# Patient Record
Sex: FEMALE | Race: OTHER | NOT HISPANIC OR LATINO
[De-identification: names, ages, dates, MRNs, and addresses within clinical notes are randomized per-mention and may not be internally consistent; named-entity substitution may affect disease eponyms.]

---

## 2018-06-04 ENCOUNTER — APPOINTMENT (OUTPATIENT)
Dept: INTERNAL MEDICINE | Facility: CLINIC | Age: 63
End: 2018-06-04

## 2021-04-21 ENCOUNTER — TRANSCRIPTION ENCOUNTER (OUTPATIENT)
Age: 66
End: 2021-04-21

## 2021-07-13 ENCOUNTER — TRANSCRIPTION ENCOUNTER (OUTPATIENT)
Age: 66
End: 2021-07-13

## 2021-12-29 ENCOUNTER — APPOINTMENT (OUTPATIENT)
Dept: GASTROENTEROLOGY | Facility: CLINIC | Age: 66
End: 2021-12-29
Payer: MEDICARE

## 2021-12-29 VITALS
HEIGHT: 62 IN | WEIGHT: 121 LBS | DIASTOLIC BLOOD PRESSURE: 62 MMHG | TEMPERATURE: 98.1 F | SYSTOLIC BLOOD PRESSURE: 98 MMHG | BODY MASS INDEX: 22.26 KG/M2 | HEART RATE: 66 BPM

## 2021-12-29 PROCEDURE — 99204 OFFICE O/P NEW MOD 45 MIN: CPT

## 2021-12-29 NOTE — HISTORY OF PRESENT ILLNESS
[FreeTextEntry1] : 67 yo female with h/o bipolar disorder, started new medication 7 weeks ago.  Patient reports nausea 3 weeks ago.Peptobismol helps.Patient  saw PCP, telehealth, new PCP. Patient given pepcid last week, sometimes it helps. Patient took zofran which helps sometimes. \par no vomiting. some weight loss . constipation with peptobismol. patient reports one bm every 3 days.no abdominal pain.  no brbpr.  Thinks she has gerd. patietn stopped parmate 3 days ago.\par \par s/p colonoscopy few years ago told repeat 5 years,  Dr. Ceballos

## 2021-12-29 NOTE — REVIEW OF SYSTEMS
[As Noted in HPI] : as noted in HPI [Anxiety] : anxiety [Depression] : depression [Fever] : no fever [Chills] : no chills [Eyesight Problems] : no eyesight problems [Discharge From Eyes] : no purulent discharge from the eyes [Nosebleeds] : no nosebleeds [Chest Pain] : no chest pain [Palpitations] : no palpitations [Cough] : no cough [SOB on Exertion] : no shortness of breath during exertion [Dysmenorrhea] : no dysmenorrhea [Limb Pain] : no limb pain [Limb Swelling] : no limb swelling [Muscle Weakness] : no muscle weakness [Deepening Of The Voice] : no deepening of the voice [Swollen Glands] : no swollen glands [Swollen Glands In The Neck] : no swollen glands in the neck

## 2021-12-29 NOTE — PHYSICAL EXAM
[General Appearance - Alert] : alert [General Appearance - In No Acute Distress] : in no acute distress [General Appearance - Well Nourished] : well nourished [General Appearance - Well Developed] : well developed [Sclera] : the sclera and conjunctiva were normal [Hearing Threshold Finger Rub Not [place]] : hearing was normal [Respiration, Rhythm And Depth] : normal respiratory rhythm and effort [Auscultation Breath Sounds / Voice Sounds] : lungs were clear to auscultation bilaterally [Heart Rate And Rhythm] : heart rate was normal and rhythm regular [Heart Sounds] : normal S1 and S2 [Bowel Sounds] : normal bowel sounds [Abdomen Soft] : soft [Abdomen Tenderness] : non-tender [No CVA Tenderness] : no ~M costovertebral angle tenderness [] : no rash [Skin Lesions] : no skin lesions [No Focal Deficits] : no focal deficits [Oriented To Time, Place, And Person] : oriented to person, place, and time [Abnormal Walk] : normal gait

## 2021-12-29 NOTE — ASSESSMENT
[FreeTextEntry1] : 1. nausea, h/o intermittent nausea in the  past that resolved spontaneously, probable gerd also in ddx; gastric dysmotility, recommend egd to evaluate pt does not want to pursue at this time.\par \par plan ppi daily for one month\par          zofran prn\par        antireflux diet\par           celiac markers requested after pt on gluten diet\par 2. chronic constipation\par \par plan  miralax daily\par

## 2022-04-04 ENCOUNTER — TRANSCRIPTION ENCOUNTER (OUTPATIENT)
Age: 67
End: 2022-04-04

## 2022-04-07 ENCOUNTER — APPOINTMENT (OUTPATIENT)
Dept: NEUROLOGY | Facility: CLINIC | Age: 67
End: 2022-04-07

## 2023-01-09 ENCOUNTER — APPOINTMENT (OUTPATIENT)
Dept: GASTROENTEROLOGY | Facility: CLINIC | Age: 68
End: 2023-01-09

## 2023-08-08 ENCOUNTER — APPOINTMENT (OUTPATIENT)
Dept: RHEUMATOLOGY | Facility: CLINIC | Age: 68
End: 2023-08-08
Payer: MEDICARE

## 2023-08-08 ENCOUNTER — LABORATORY RESULT (OUTPATIENT)
Age: 68
End: 2023-08-08

## 2023-08-08 VITALS
WEIGHT: 109 LBS | BODY MASS INDEX: 20.06 KG/M2 | SYSTOLIC BLOOD PRESSURE: 101 MMHG | OXYGEN SATURATION: 94 % | HEIGHT: 62 IN | DIASTOLIC BLOOD PRESSURE: 68 MMHG | HEART RATE: 81 BPM

## 2023-08-08 DIAGNOSIS — M81.0 AGE-RELATED OSTEOPOROSIS W/OUT CURRENT PATHOLOGICAL FRACTURE: ICD-10-CM

## 2023-08-08 PROCEDURE — 99204 OFFICE O/P NEW MOD 45 MIN: CPT

## 2023-08-08 RX ORDER — POLYETHYLENE GLYCOL 3350 17 G/17G
17 POWDER, FOR SOLUTION ORAL DAILY
Qty: 30 | Refills: 3 | Status: DISCONTINUED | COMMUNITY
Start: 2021-12-29 | End: 2023-08-08

## 2023-08-08 RX ORDER — PANTOPRAZOLE 40 MG/1
40 TABLET, DELAYED RELEASE ORAL
Qty: 30 | Refills: 2 | Status: DISCONTINUED | COMMUNITY
Start: 2021-12-29 | End: 2023-08-08

## 2023-08-08 RX ORDER — ONDANSETRON 8 MG/1
8 TABLET, ORALLY DISINTEGRATING ORAL 3 TIMES DAILY
Qty: 90 | Refills: 1 | Status: DISCONTINUED | COMMUNITY
Start: 2021-12-29 | End: 2023-08-08

## 2023-08-08 NOTE — PHYSICAL EXAM
[General Appearance - Alert] : alert [General Appearance - In No Acute Distress] : in no acute distress [Sclera] : the sclera and conjunctiva were normal [Neck Appearance] : the appearance of the neck was normal [Abnormal Walk] : normal gait [Musculoskeletal - Swelling] : no joint swelling seen [] : no rash [No Focal Deficits] : no focal deficits [FreeTextEntry1] : good skin turgor, no evidence or prior or current rash

## 2023-08-08 NOTE — HISTORY OF PRESENT ILLNESS
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 Herrera thought she might have CREST based upon a specific antibody but she didn't have blood work with her today recently took herself off lithium and parnate (working with psych on this change) to see if that helped the nausea she has tried PPI's for the nausea without relief   no dry eyes + Dry mouth  no Rashes no chest pain  no shortness of breath  no sun sensitivity  no Raynaud's  + cold intolerance  mild occasional joint pain  feels like the nausea makes her weak and the lack of exercise makes her weak.   6 pregnancies, 3 healthy children, 3 abortions.  no miscarriages.  3rd pregnancy was placenta previa.   no preeclampsia or eclampsia   Bone health -- stopped taking medications.  had a dexa by her endocrinologist recently and is working with Dr. Barillas her endocrinologist.   constipation for the past few months and planning on colonoscopy (last one was 5 years ago) can only have bm if she takes a laxative and enemas she is having trouble getting the enema into her bottom Planning colonoscopy and she will be making an appointment shortly.

## 2023-08-08 NOTE — DATA REVIEWED
[FreeTextEntry1] : Laboratory and radiology studies that were personally reviewed at today's visit are summarized in above and below:  8-2-2023:  + LAKESHA (no titer) on blood work from Dr. Herrera

## 2023-08-08 NOTE — ASSESSMENT
[FreeTextEntry1] : + LAKESHA  Constipation  Bipolar Depression   Unclear etiology of nausea and likely is multifactorial  Will look for diseases associated with + LAKESHA and nausea with blood work below   Osteoporosis  not treted for many years  follow up endocrinology  check vitamin d today  encouraged her to take calcium and to follow up endocrinology for treatment options.   More than 50% of the encounter was spent counseling LETICIA ZAMORA on differential, workup, disease course, and treatment/management.   Education was provided to LETICIA ZAMORA during this encounter. All questions and concerns were answered and addressed in detail.  LETICIA ZAMORA verbalized understanding and agreed to the plan.   LETICIA ZAMORA has been instructed to call for an earlier appointment if new symptoms develop in the interim. LETICIA ZAMORA has been instructed to call in 2 weeks for results if she has not heard back from the office regarding her results.

## 2023-08-17 LAB
25(OH)D3 SERPL-MCNC: 29.2 NG/ML
ALBUMIN SERPL ELPH-MCNC: 4.5 G/DL
ALDOLASE SERPL-CCNC: 3.7 U/L
ALP BLD-CCNC: 68 U/L
ALT SERPL-CCNC: 18 U/L
ANA PAT FLD IF-IMP: ABNORMAL
ANA SER IF-ACNC: ABNORMAL
ANAPLASMA PHAGOCYTOPHILIA IGG ANTIBODIES: NEGATIVE
ANAPLASMA PHAGOCYTOPHILIA IGM ANTIBODIES: NEGATIVE
ANION GAP SERPL CALC-SCNC: 10 MMOL/L
APPEARANCE: CLEAR
AST SERPL-CCNC: 26 U/L
BACTERIA: NEGATIVE /HPF
BILIRUB SERPL-MCNC: 0.2 MG/DL
BILIRUBIN URINE: NEGATIVE
BLOOD URINE: NEGATIVE
BUN SERPL-MCNC: 9 MG/DL
C3 SERPL-MCNC: 92 MG/DL
C4 SERPL-MCNC: 22 MG/DL
CALCIUM SERPL-MCNC: 9.5 MG/DL
CAST: 0 /LPF
CCP AB SER IA-ACNC: <8 UNITS
CENTROMERE IGG SER-ACNC: 5.9 AL
CHLORIDE SERPL-SCNC: 107 MMOL/L
CK SERPL-CCNC: 202 U/L
CO2 SERPL-SCNC: 25 MMOL/L
COLOR: YELLOW
CONFIRM: 33.2 SEC
CREAT SERPL-MCNC: 1 MG/DL
CREAT SPEC-SCNC: 33 MG/DL
CREAT/PROT UR: NORMAL RATIO
CRP SERPL-MCNC: <3 MG/L
DRVVT IMM 1:2 NP PPP: NORMAL
DRVVT SCREEN TO CONFIRM RATIO: 0.85 RATIO
DSDNA AB SER-ACNC: <12 IU/ML
EGFR: 62 ML/MIN/1.73M2
EHRLICIA CHAFFEENIS IGG ANTIBODIES: NEGATIVE
EHRLICIA CHAFFEENIS IGM ANTIBODIES: NEGATIVE
ENA RNP AB SER IA-ACNC: 0.5 AL
ENA SCL70 IGG SER IA-ACNC: <0.2 AL
ENA SM AB SER IA-ACNC: <0.2 AL
ENA SS-A AB SER IA-ACNC: <0.2 AL
ENA SS-B AB SER IA-ACNC: 0.2 AL
EPITHELIAL CELLS: 0 /HPF
ERHLICIA RESULT COMMENT: NORMAL
GLUCOSE QUALITATIVE U: NEGATIVE MG/DL
GLUCOSE SERPL-MCNC: 106 MG/DL
IGA SER QL IEP: 233 MG/DL
IGG SER QL IEP: 1116 MG/DL
IGM SER QL IEP: 118 MG/DL
KETONES URINE: NEGATIVE MG/DL
LEUKOCYTE ESTERASE URINE: ABNORMAL
MICROSCOPIC-UA: NORMAL
MYOGLOBIN SERPL-MCNC: 36 NG/ML
NITRITE URINE: NEGATIVE
PH URINE: 7
POTASSIUM SERPL-SCNC: 5.2 MMOL/L
PROT SERPL-MCNC: 7.2 G/DL
PROT UR-MCNC: <4 MG/DL
PROTEIN URINE: NEGATIVE MG/DL
RED BLOOD CELLS URINE: 0 /HPF
RF+CCP IGG SER-IMP: NEGATIVE
RIBOSOMAL P AB SER IA-ACNC: <0.2 AL
RNA POLYMERASE III IGG: 9 UNITS
SCREEN DRVVT: 33.6 SEC
SODIUM SERPL-SCNC: 142 MMOL/L
SPECIFIC GRAVITY URINE: 1.01
THYROGLOB AB SERPL-ACNC: <20 IU/ML
THYROPEROXIDASE AB SERPL IA-ACNC: 87.9 IU/ML
TSH SERPL-ACNC: 0.8 UIU/ML
UROBILINOGEN URINE: 0.2 MG/DL
WHITE BLOOD CELLS URINE: 0 /HPF

## 2023-08-18 ENCOUNTER — APPOINTMENT (OUTPATIENT)
Dept: RHEUMATOLOGY | Facility: CLINIC | Age: 68
End: 2023-08-18
Payer: MEDICARE

## 2023-08-18 PROCEDURE — 99442: CPT | Mod: 95

## 2023-09-01 ENCOUNTER — APPOINTMENT (OUTPATIENT)
Dept: GASTROENTEROLOGY | Facility: CLINIC | Age: 68
End: 2023-09-01
Payer: MEDICARE

## 2023-09-01 VITALS
WEIGHT: 130 LBS | DIASTOLIC BLOOD PRESSURE: 69 MMHG | SYSTOLIC BLOOD PRESSURE: 102 MMHG | BODY MASS INDEX: 23.92 KG/M2 | HEIGHT: 62 IN | OXYGEN SATURATION: 98 % | HEART RATE: 70 BPM

## 2023-09-01 PROCEDURE — 99214 OFFICE O/P EST MOD 30 MIN: CPT

## 2023-09-01 NOTE — REVIEW OF SYSTEMS
[As Noted in HPI] : as noted in HPI [Urinary Difficulties] : urinary difficulties [Anxiety] : anxiety [Depression] : depression [Negative] : Heme/Lymph [FreeTextEntry8] : Episodes of urinary incontinence.

## 2023-09-01 NOTE — CONSULT LETTER
[Dear  ___] : Dear  [unfilled], [Consult Letter:] : I had the pleasure of evaluating your patient, [unfilled]. [( Thank you for referring [unfilled] for consultation for _____ )] : Thank you for referring [unfilled] for consultation for [unfilled] [Please see my note below.] : Please see my note below. [Consult Closing:] : Thank you very much for allowing me to participate in the care of this patient.  If you have any questions, please do not hesitate to contact me. [Sincerely,] : Sincerely, [FreeTextEntry2] : Dr. Aleksander Hood [FreeTextEntry3] : Palbo Orosco MD

## 2023-09-01 NOTE — REASON FOR VISIT
[Consultation] : a consultation visit [FreeTextEntry1] : for evaluation of chronic nausea and constipation.

## 2023-09-01 NOTE — HISTORY OF PRESENT ILLNESS
[FreeTextEntry1] : Office consultation on 2023.  Patient is a 68-year-old woman evaluated for consultation regarding complaints of chronic nausea and chronic constipation.  Of note, her dominant complaint is nausea.  Experiencing persistent nausea of 18 months duration (does recall episode of nausea 30 years ago lasting 1 month but fully resolved up until current symptoms).  Recalls sudden onset of current complaint of nausea 18 months ago while patient was visiting [place].  Precipitating factors not reported such as sick contacts, antibiotics, new medicines etc.  Indicates COVID-vaccine 3 months before which she is suspicious of as having been provocative.  Has never had COVID.  Nausea is intermittent but occurs on a daily basis.  Overeating and certain foods such as sweets and pastry as well as increased anxiety can be provocative.  However, nausea is also decreased in association with eating but approximately 30 minutes postprandial recurs.  Nausea reminds her of nausea of pregnancy like symptom.  Current symptoms of variable severity.  Reports no associated symptoms.  Denies any associated vomiting.  No complaints of headache, migraine, dizziness, vertigo or earache.  Denies complaints of dysphagia, vomiting, early satiety or abdominal pain.  Experiences intermittent heartburn described as retrosternal burning with episodes approximately twice per week.  Provoked by overeating or eating too late.  Of note, she does not indicate any temporal association between episodes of heartburn and her complaint of nausea.  Previously had been treated with Prilosec 40 mg daily for approximately 4 months and subsequently 40 mg twice daily for approximately 3 months which had no impact on her complaint of nausea.  Appetite good but indicates gradual 20 pound weight loss over past 8 months that she largely attributes to various diets prescribed by various physicians such as low FODMAP diet.  Patient has longstanding chronic constipation.  Somewhat increased in severity over past 18 months.  Previous bowel habit characterized as [place] 2 evacuations approximately every 2 to 3 days.  Past 18 months using various laxatives including senna, enemas, Metamucil and mostly her current regimen of prune lax which apparently contains senna.  On these various agents she typically has bowel movements approximately 3 times per week with loose evacuations.  Denies any rectal bleeding.  Reports no association between her complaints of constipation and timing of bowel movements and severity of nausea.  Gets no relief of nausea after achieving a bowel evacuation.  The patient has seen numerous physicians regarding her current symptoms including numerous gastroenterologists (recalls seeing Dr. Cyril Villar, Dr. Massimo Trammell, Dr. Solo Mendieta, Dr. Ceballos as well as other gastroenterologists locally and also in [place]).  In addition, follows various alternative and holistic practitioners.  Has had various evaluation and investigations but await receipt of records for review.  Patient indicates that multiple abdominal ultrasounds as well as nuclear gastric emptying study, lactulose breath hydrogen test and upper endoscopy were nondiagnostic.  Has been advised to have CT scan of the abdomen but has not yet proceeded.  MRI of the brain was normal.  Indicates various physicians have deduced varying causes of her symptoms.  Has been told of possible SIBO.  There was discussion of regimen of Xifaxan and neomycin but patient elected not to proceed.  Has tried low FODMAP diet, gluten-free diet, dairy free diet none of which have helped nausea.  No benefit with sucralfate.  Not currently taking PPI antisecretory therapy which did not help nausea.  Currently utilizing Reflux Gourmet for heartburn symptoms.  Does indicate significant symptomatic improvement with Zofran and currently uses approximately 4 mg daily.  Does not recall ever taking Reglan, domperidone, Compazine, hydroxyzine or scopolamine patch.  Patient has a history of bipolar disorder.  Indicates being on lithium on and off of many years duration.  Currently on a regimen of lithium 300 mg daily, Ativan 4 mg at bedtime and Parnate 10 mg.  Mainly uses Ativan for sleep.  Indicates some benefit with control of nausea.  Patient indicates last colonoscopy was approximately 6 years ago.  Indicates being evaluated for follow-up.  The patient reports no other history of digestive illness except as noted.  Family history of colon cancer is not reported.  PMH: Past diseases–bipolar disorder, pelvic fracture. Operations– section x 1.   Allergies–none. Family history: Parents –both had Alzheimer's.  Mother history of IBS.  Sister in good health.  Family history colon cancer not reported. Social history: Tobacco–none.  Alcohol–none.  Denies caffeine.  .  Patient has 2 sons and 1 daughter.  Retired ophthalmologist. Medications: Parnate 10 mg, Ativan 4 mg at bedtime, lithium 300 mg, Zofran 4 mg as needed.

## 2023-09-01 NOTE — PHYSICAL EXAM
[Alert] : alert [Normal Voice/Communication] : normal voice/communication [Healthy Appearing] : healthy appearing [No Acute Distress] : no acute distress [Sclera] : the sclera and conjunctiva were normal [Normal Appearance] : the appearance of the neck was normal [No Respiratory Distress] : no respiratory distress [No Acc Muscle Use] : no accessory muscle use [Respiration, Rhythm And Depth] : normal respiratory rhythm and effort [Auscultation Breath Sounds / Voice Sounds] : lungs were clear to auscultation bilaterally [Heart Rate And Rhythm] : heart rate was normal and rhythm regular [Normal S1, S2] : normal S1 and S2 [Murmurs] : no murmurs [None] : no edema [Bowel Sounds] : normal bowel sounds [Abdomen Tenderness] : non-tender [No Masses] : no abdominal mass palpated [Abdomen Soft] : soft [] : no hepatosplenomegaly [Cervical Lymph Nodes Enlarged Posterior Bilaterally] : no posterior cervical lymphadenopathy [Supraclavicular Lymph Nodes Enlarged Bilaterally] : no supraclavicular lymphadenopathy [Cervical Lymph Nodes Enlarged Anterior Bilaterally] : no anterior cervical lymphadenopathy [Abnormal Walk] : normal gait [No Clubbing, Cyanosis] : no clubbing or cyanosis of the fingernails [Normal Color / Pigmentation] : normal skin color and pigmentation [Oriented To Time, Place, And Person] : oriented to person, place, and time [Normal Affect] : the affect was normal [Normal Insight/Judgment] : insight and judgment were intact [de-identified] : Hoarse vocal quality–indicates current "laryngitis". [de-identified] : Healed suprapubic midline scar.

## 2023-09-01 NOTE — ADDENDUM
[FreeTextEntry1] : Impression:  #1 dominant complaint is chronic nausea of 18 months duration.  Possible etiology, diagnostic measures and treatments reviewed.  As noted, patient has seen multiple other gastroenterologists apparently with extensive investigations.  Patient advised to have these reports submitted for my review.  Following review of records I will discuss with patient options regarding further diagnostic evaluation and possible treatment.  As noted, patient has been advised by various practitioners of different possible causes such as SIBO and IBS.  Not clear to me that this is likely the fundamental basis for her chronic nausea.  As noted, no response to high-dose twice daily PPI as well as various diets such as low FODMAP diet, gluten-free etc.  Concern exists regarding patient's weight loss possibly related to issues of avoidance restrictive food intake disorder.  #2 does benefit from Zofran–continue Zofran 4 mg every 6-8 hours as needed.  #3 antireflux precautions.  Patient stopped PPI therapy on her own.  Has occasional heartburn but relatively mild symptom.  If needed, H2 blockers can be resumed.  #4 high-fiber diet as tolerated in conjunction with adequate fluid intake and physical activity.  Currently reports benefit with her senna based laxative.  Pending clinical course other options for constipation will be considered such as Linzess or Trulance.  Might also consider Motegrity or olanzapine but would confer with psychiatrist regarding these options.  #5 await receipt of reports for review.  Pending assessment further diagnostic measures to be considered including Smart pill or nuclear GI tract transit study will be considered.  As noted, advised by other practitioners to have CT scan abdomen and recommended she have this report submitted for my review.  #6 although denies any dizziness, vertigo or other vestibular symptoms I advise ENT consultation for further evaluation.  #7 patient was advised to contact me in approximately 1 month after records have been submitted for my review.

## 2023-09-01 NOTE — ASSESSMENT
[FreeTextEntry1] : Impression:  #1 chronic nausea–acute onset of what has become a chronic complaint of nausea over past 18 months.  Nausea occurs as an isolated symptom without any associated CNS, vestibular or other associated GI complaints.  Does have occasional heartburn but appears to be unrelated to nausea as the symptoms occur independent of each other and patient derived no benefit of nausea despite twice daily PPI for 3 months.  Etiology of nausea is unclear–apparently work-up without indication of PUD, G00, gastroparesis or evident biliary etiology.  Possibly medication related.  #2 chronic constipation–described as "lifelong".  Increase severity past 18 months but appears to be a chronic problem preceding onset of nausea (therefore not clear that chronic constipation and nausea are attributed to a singular unifying mechanism).  Likely normal transit constipation of functional etiology possibly exacerbated by medications as well as various dietary interventions.  #3 colon cancer screening–average risk.  Reports no past history colonic polyps.  #4 history hemorrhoids.  General medical problems:  -Bipolar disorder.  -Status post  section x 1.  -History of pelvic fracture.

## 2023-10-13 ENCOUNTER — NON-APPOINTMENT (OUTPATIENT)
Age: 68
End: 2023-10-13

## 2023-12-06 ENCOUNTER — NON-APPOINTMENT (OUTPATIENT)
Age: 68
End: 2023-12-06

## 2023-12-06 ENCOUNTER — APPOINTMENT (OUTPATIENT)
Dept: GASTROENTEROLOGY | Facility: CLINIC | Age: 68
End: 2023-12-06

## 2023-12-19 ENCOUNTER — NON-APPOINTMENT (OUTPATIENT)
Age: 68
End: 2023-12-19

## 2024-02-27 ENCOUNTER — APPOINTMENT (OUTPATIENT)
Dept: SURGERY | Facility: CLINIC | Age: 69
End: 2024-02-27

## 2024-05-06 ENCOUNTER — APPOINTMENT (OUTPATIENT)
Dept: GASTROENTEROLOGY | Facility: CLINIC | Age: 69
End: 2024-05-06
Payer: MEDICARE

## 2024-05-06 VITALS
HEART RATE: 72 BPM | OXYGEN SATURATION: 98 % | HEIGHT: 62 IN | DIASTOLIC BLOOD PRESSURE: 70 MMHG | WEIGHT: 108 LBS | BODY MASS INDEX: 19.88 KG/M2 | SYSTOLIC BLOOD PRESSURE: 101 MMHG

## 2024-05-06 DIAGNOSIS — K59.09 OTHER CONSTIPATION: ICD-10-CM

## 2024-05-06 DIAGNOSIS — E04.0 NONTOXIC DIFFUSE GOITER: ICD-10-CM

## 2024-05-06 PROCEDURE — 99214 OFFICE O/P EST MOD 30 MIN: CPT

## 2024-05-06 NOTE — ASSESSMENT
[FreeTextEntry1] : Impression:  #1 chronic nausea-               -Acute onset of what has become a chronic complaint of nausea over past 18 months.  Nausea occurs as an isolated symptom without any associated CNS, vestibular or other associated GI complaints.  Does have occasional heartburn but appears to be unrelated to nausea as the symptoms occur independent of each other and patient derived no benefit of nausea despite twice daily PPI for 3 months.  Etiology of nausea is unclear-apparently work-up without indication of PUD, G00, gastroparesis or evident biliary etiology.  Possibly medication related.              -CURRENT (2024): Persistent unremitting nausea.  Essentially isolated symptoms-no significant associated complaints.  Etiology unclear.  #2 chronic constipation-described as "lifelong".  Increase severity past 18 months but appears to be a chronic problem preceding onset of nausea (therefore not clear that chronic constipation and nausea are attributed to a singular unifying mechanism).  Likely normal transit constipation of functional etiology possibly exacerbated by medications as well as various dietary interventions.  #3 colon cancer screening-average risk.  Reports no past history colonic polyps.  #4 history hemorrhoids.  General medical problems:  -Bipolar disorder.  -Status post  section x 1.  -History of pelvic fracture.

## 2024-05-06 NOTE — ADDENDUM
[FreeTextEntry1] : Plan:  #1 ongoing chronic nausea as well as concern regarding weight loss was discussed with the patient.  Discussed with patient concern that she is seeing numerous physicians with fragmented care.  Advised her to obtain all relevant records for my review.  I advised her to identify a single gastroenterologist and internist so as to focus her medical care and avoid this issue of fragmented care.  At present, patient indicates she will follow-up with Dr. Wilbert Dong at [facility] as well as internist Dr. Harman Modi.  I did advise that she confer with these doctors as to whether any additional assessment would be of value such as dynamic HIDA scan, CT scan abdomen in view of weight loss as well as nuclear scan GI transit study.  #2 goal of identifying underlying cause of nausea discussed with patient.  However, this may not prove feasible.  Further treatment options from a symptomatic standpoint were discussed and I advised that she confer with her primary physicians regarding whether mirtazapine or olanzapine could be an option.  She would also have to confer with her psychiatrist in view of history of type I bipolar depression and current regimen of Parnate.  #3 patient will follow-up with Dr. Dong and Dr. Modi.  She was advised to notify me of any results of diagnostic evaluation and as to whether I can be of any additional assistance regarding her chronic complaint of chronic nausea.

## 2024-05-06 NOTE — HISTORY OF PRESENT ILLNESS
[FreeTextEntry1] : Office revisit on 2024.  Presents for follow-up regarding complaint of chronic nausea.  Previously evaluated for office consultation on 2023.  INITIAL CONSULTATION NOTE (2023):  Patient is a 68-year-old woman evaluated for consultation regarding complaints of chronic nausea and chronic constipation.  Of note, her dominant complaint is nausea.  Experiencing persistent nausea of 18 months duration (does recall episode of nausea 30 years ago lasting 1 month but fully resolved up until current symptoms).  Recalls sudden onset of current complaint of nausea 18 months ago while patient was visiting [place].  Precipitating factors not reported such as sick contacts, antibiotics, new medicines etc.  Indicates COVID-vaccine 3 months before which she is suspicious of as having been provocative.  Has never had COVID.  Nausea is intermittent but occurs on a daily basis.  Overeating and certain foods such as sweets and pastry as well as increased anxiety can be provocative.  However, nausea is also decreased in association with eating but approximately 30 minutes postprandial recurs.  Nausea reminds her of nausea of pregnancy like symptom.  Current symptoms of variable severity.  Reports no associated symptoms.  Denies any associated vomiting.  No complaints of headache, migraine, dizziness, vertigo or earache.  Denies complaints of dysphagia, vomiting, early satiety or abdominal pain.  Experiences intermittent heartburn described as retrosternal burning with episodes approximately twice per week.  Provoked by overeating or eating too late.  Of note, she does not indicate any temporal association between episodes of heartburn and her complaint of nausea.  Previously had been treated with Prilosec 40 mg daily for approximately 4 months and subsequently 40 mg twice daily for approximately 3 months which had no impact on her complaint of nausea.  Appetite good but indicates gradual 20 pound weight loss over past 8 months that she largely attributes to various diets prescribed by various physicians such as low FODMAP diet.  Patient has longstanding chronic constipation.  Somewhat increased in severity over past 18 months.  Previous bowel habit characterized as Geary 2 evacuations approximately every 2 to 3 days.  Past 18 months using various laxatives including senna, enemas, Metamucil and mostly her current regimen of prune lax which apparently contains senna.  On these various agents she typically has bowel movements approximately 3 times per week with loose evacuations.  Denies any rectal bleeding.  Reports no association between her complaints of constipation and timing of bowel movements and severity of nausea.  Gets no relief of nausea after achieving a bowel evacuation.  The patient has seen numerous physicians regarding her current symptoms including numerous gastroenterologists (recalls seeing Dr. Cyril Villar, Dr. Massimo Trammell, Dr. Solo Mendieta, Dr. Ceballos as well as other gastroenterologists locally and also in [place]).  In addition, follows various alternative and holistic practitioners.  Has had various evaluation and investigations but await receipt of records for review.  Patient indicates that multiple abdominal ultrasounds as well as nuclear gastric emptying study, lactulose breath hydrogen test and upper endoscopy were nondiagnostic.  Has been advised to have CT scan of the abdomen but has not yet proceeded.  MRI of the brain was normal.  Indicates various physicians have deduced varying causes of her symptoms.  Has been told of possible SIBO.  There was discussion of regimen of Xifaxan and neomycin but patient elected not to proceed.  Has tried low FODMAP diet, gluten-free diet, dairy free diet none of which have helped nausea.  No benefit with sucralfate.  Not currently taking PPI antisecretory therapy which did not help nausea.  Currently utilizing Reflux Gourmet for heartburn symptoms.  Does indicate significant symptomatic improvement with Zofran and currently uses approximately 4 mg daily.  Does not recall ever taking Reglan, domperidone, Compazine, hydroxyzine or scopolamine patch.  Patient has a history of bipolar disorder.  Indicates being on lithium on and off of many years duration.  Currently on a regimen of lithium 300 mg daily, Ativan 4 mg at bedtime and Parnate 10 mg.  Mainly uses Ativan for sleep.  Indicates some benefit with control of nausea.  Patient indicates last colonoscopy was approximately 6 years ago.  Indicates being evaluated for follow-up.  The patient reports no other history of digestive illness except as noted.  Family history of colon cancer is not reported.  PMH: Past diseases-bipolar disorder, pelvic fracture. Operations- section x 1.   Allergies-none. Family history: Parents -both had Alzheimer's.  Mother history of IBS.  Sister in good health.  Family history colon cancer not reported. Social history: Tobacco-none.  Alcohol-none.  Denies caffeine.  .  Patient has 2 sons and 1 daughter.  Retired ophthalmologist. Medications: Parnate 10 mg, Ativan 4 mg at bedtime, lithium 300 mg, Zofran 4 mg as needed.  CURRENT HISTORY:  ORV 2024:      -Presents for GI follow-up regarding chronic nausea.  Ongoing persistent nausea persists without improvement.  Of note, since prior consultation patient has continued to see numerous other practitioners including several other gastroenterologist, internists and other practitioners regarding her complaint of nausea.  Within the last week she saw a new gastroenterologist who performed a lactulose breath hydrogen test deemed positive for SIBO as well as a new internist.  Still await receipt of records for review.  Available records were discussed with patient that I have received so far including nondiagnostic abdominal ultrasound, EGD and gastric emptying study.  She indicates multiple other studies done.  As noted, last week saw Dr. Wilbert Dong of GI at [facility] as well as a new internist (Dr. Harman Modi).  In addition, at varying times to see numerous other GI physicians including Dr. Solo Mendieta, Dr. Massimo Trammell, Dr. Ceballos as well as GI physicians at [facility] in [place] as well as others.                            -Chronic nausea persists.  Worse after sweets such as pastry and sugary foods.  No improvement.  Nausea was previously intermittent but now near constant although fluctuates in severity denies any associated symptoms such as headache, diplopia, focal neurologic symptoms, vertigo, tinnitus or other complaints.  Indicates stable appetite but does report an approximate 30 pound weight loss over the past 2 years.  Denies complaints of dysphagia, heartburn, regurgitation or any associated vomiting.  Eating somewhat less but denies early satiety, abdominal pain, bloating or distention.  Has longstanding chronic constipation with bowel movements every 2 to 3 days.  Utilizing senna and prune laxative for constipation.  Complains of fatigue and "brain fog".                            -Prior regimens of sucralfate and PPI therapy even twice daily and effective.  Zofran 4 to 8 mg with modest improvement.  Ginger and licorice can help somewhat.  Reglan was prescribed but she never took it.                           -Medications: Parnate 10 mg, Ativan 3 mg at bedtime, prune lax.

## 2024-05-06 NOTE — CONSULT LETTER
[Dear  ___] : Dear  [unfilled], [Consult Letter:] : I had the pleasure of evaluating your patient, [unfilled]. [( Thank you for referring [unfilled] for consultation for _____ )] : Thank you for referring [unfilled] for consultation for [unfilled] [Please see my note below.] : Please see my note below. [Consult Closing:] : Thank you very much for allowing me to participate in the care of this patient.  If you have any questions, please do not hesitate to contact me. [Sincerely,] : Sincerely, [FreeTextEntry2] : Dr. Aleksander Hood [FreeTextEntry3] : Pablo Orosco MD

## 2024-05-06 NOTE — PHYSICAL EXAM
[Alert] : alert [Normal Voice/Communication] : normal voice/communication [Healthy Appearing] : healthy appearing [No Acute Distress] : no acute distress [Sclera] : the sclera and conjunctiva were normal [Normal Appearance] : the appearance of the neck was normal [No Respiratory Distress] : no respiratory distress [No Acc Muscle Use] : no accessory muscle use [Respiration, Rhythm And Depth] : normal respiratory rhythm and effort [Auscultation Breath Sounds / Voice Sounds] : lungs were clear to auscultation bilaterally [Heart Rate And Rhythm] : heart rate was normal and rhythm regular [Normal S1, S2] : normal S1 and S2 [Murmurs] : no murmurs [None] : no edema [Bowel Sounds] : normal bowel sounds [Abdomen Tenderness] : non-tender [No Masses] : no abdominal mass palpated [Abdomen Soft] : soft [] : no hepatosplenomegaly [Cervical Lymph Nodes Enlarged Posterior Bilaterally] : no posterior cervical lymphadenopathy [Supraclavicular Lymph Nodes Enlarged Bilaterally] : no supraclavicular lymphadenopathy [Cervical Lymph Nodes Enlarged Anterior Bilaterally] : no anterior cervical lymphadenopathy [Abnormal Walk] : normal gait [No Clubbing, Cyanosis] : no clubbing or cyanosis of the fingernails [Normal Color / Pigmentation] : normal skin color and pigmentation [Oriented To Time, Place, And Person] : oriented to person, place, and time [Normal Affect] : the affect was normal [Normal Insight/Judgment] : insight and judgment were intact [de-identified] : Healed suprapubic midline scar.  No bruits.  Good femoral pulses.

## 2024-05-13 ENCOUNTER — APPOINTMENT (OUTPATIENT)
Dept: RHEUMATOLOGY | Facility: CLINIC | Age: 69
End: 2024-05-13
Payer: MEDICARE

## 2024-05-13 VITALS
WEIGHT: 108 LBS | TEMPERATURE: 98.1 F | HEART RATE: 84 BPM | RESPIRATION RATE: 16 BRPM | DIASTOLIC BLOOD PRESSURE: 67 MMHG | SYSTOLIC BLOOD PRESSURE: 100 MMHG | OXYGEN SATURATION: 98 % | HEIGHT: 62 IN | BODY MASS INDEX: 19.88 KG/M2

## 2024-05-13 DIAGNOSIS — M25.562 PAIN IN RIGHT KNEE: ICD-10-CM

## 2024-05-13 DIAGNOSIS — M25.561 PAIN IN RIGHT KNEE: ICD-10-CM

## 2024-05-13 DIAGNOSIS — G89.29 PAIN IN RIGHT KNEE: ICD-10-CM

## 2024-05-13 PROCEDURE — 99215 OFFICE O/P EST HI 40 MIN: CPT

## 2024-05-13 PROCEDURE — G2211 COMPLEX E/M VISIT ADD ON: CPT

## 2024-05-13 NOTE — HISTORY OF PRESENT ILLNESS
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 no swelling.  + ttp when she puts weight on the knee   chronic nausea - reports has seen many GI doctors in the past.  Notes she has had 2 motility studdies earlier one at [facility] and recent one at [place] (Dr. Wilbert Dong)   no dry eyes + Dry mouth  no Rashes but has some redness on the right 4th finger.  no chest pain  no shortness of breath  no sun sensitivity  no Raynaud's  no digital ulcerations   6 pregnancies, 3 healthy children, 3 abortions.  no miscarriages.  3rd pregnancy was placenta previa.   no preeclampsia or eclampsia   Bone health -- stopped taking medications.  had a dexa by her endocrinologist recently and is working with Dr. Barillas her endocrinologist.

## 2024-05-13 NOTE — PHYSICAL EXAM
[General Appearance - Alert] : alert [General Appearance - In No Acute Distress] : in no acute distress [Sclera] : the sclera and conjunctiva were normal [Neck Appearance] : the appearance of the neck was normal [] : no rash [No Focal Deficits] : no focal deficits [FreeTextEntry1] : good skin turgor mild redness on right 4th finger at nail bed  [Oriented To Time, Place, And Person] : oriented to person, place, and time

## 2024-05-13 NOTE — REASON FOR VISIT
[Follow-Up: _____] : a [unfilled] follow-up visit [FreeTextEntry1] : + LAKESHA + centromere + chronic nausea

## 2024-05-13 NOTE — ASSESSMENT
[FreeTextEntry1] : + LAKESHA  Constipation  Bipolar Depression   Unclear etiology of nausea and likely is multifactorial but given + centromere can consider crest and would follow up motility study done last week.  Repeat blood work as below   Osteoporosis  not treted for many years  follow up endocrinology  check vitamin d today  encouraged her to take calcium and to follow up endocrinology for treatment options.   More than 50% of the encounter was spent counseling LETICIA ZAMORA on differential, workup, disease course, and treatment/management.   Education was provided to LETICIA ZAMORA during this encounter. All questions and concerns were answered and addressed in detail.  LETICIA ZAMORA verbalized understanding and agreed to the plan.   LETICIA ZAMORA has been instructed to call for an earlier appointment if new symptoms develop in the interim. LETICIA ZAMORA has been instructed to call in 2 weeks for results if she has not heard back from the office regarding her results.

## 2024-06-25 ENCOUNTER — LABORATORY RESULT (OUTPATIENT)
Age: 69
End: 2024-06-25

## 2024-06-25 ENCOUNTER — APPOINTMENT (OUTPATIENT)
Dept: RHEUMATOLOGY | Facility: CLINIC | Age: 69
End: 2024-06-25
Payer: MEDICARE

## 2024-06-25 VITALS
SYSTOLIC BLOOD PRESSURE: 100 MMHG | OXYGEN SATURATION: 98 % | BODY MASS INDEX: 19.88 KG/M2 | HEART RATE: 87 BPM | RESPIRATION RATE: 16 BRPM | WEIGHT: 108 LBS | DIASTOLIC BLOOD PRESSURE: 65 MMHG | TEMPERATURE: 98.1 F | HEIGHT: 62 IN

## 2024-06-25 DIAGNOSIS — F31.9 BIPOLAR DISORDER, UNSPECIFIED: ICD-10-CM

## 2024-06-25 DIAGNOSIS — R11.0 NAUSEA: ICD-10-CM

## 2024-06-25 DIAGNOSIS — R76.8 OTHER SPECIFIED ABNORMAL IMMUNOLOGICAL FINDINGS IN SERUM: ICD-10-CM
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
## 2024-06-25 DIAGNOSIS — Z00.00 ENCOUNTER FOR GENERAL ADULT MEDICAL EXAMINATION W/OUT ABNORMAL FINDINGS: ICD-10-CM

## 2024-06-25 PROCEDURE — 99215 OFFICE O/P EST HI 40 MIN: CPT

## 2024-06-25 PROCEDURE — G2211 COMPLEX E/M VISIT ADD ON: CPT

## 2024-06-25 RX ORDER — TRANYLCYPROMINE 10 MG/1
10 TABLET ORAL
Refills: 0 | Status: ACTIVE | COMMUNITY
Start: 2024-06-25

## 2024-06-25 RX ORDER — MIRTAZAPINE 7.5 MG/1
7.5 TABLET, FILM COATED ORAL
Refills: 0 | Status: ACTIVE | COMMUNITY
Start: 2024-06-25

## 2024-06-25 RX ORDER — LORAZEPAM 1 MG/1
1 TABLET ORAL
Refills: 0 | Status: ACTIVE | COMMUNITY
Start: 2024-06-25

## 2024-06-25 RX ORDER — KRILL/OM-3/DHA/EPA/PHOSPHO/AST 1000-230MG
81 CAPSULE ORAL
Refills: 0 | Status: ACTIVE | COMMUNITY
Start: 2024-06-25

## 2024-06-25 NOTE — HISTORY OF PRESENT ILLNESS
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  Broke metatarsal right foot (she doesn't remember how it broke)  knee is much better overall but she is wearing a brace for support  seeing multiple doctors - some are functional medicine didn't do blood work/imaging from last visit  concerned with cost of testing  requesting lyme testing and b12 level testing.   chronic nausea - reports has seen many GI doctors in the past.  Notes she has had 2 motility studies earlier one at [facility] and recent one at [place] (Dr. Wilbert Dong).  Has more than on GI doctor.  no relief with PPI's.  doesn't want to take Reglan prescribed by last GI.   no dry eyes + Dry mouth  no Rashes  no chest pain  no shortness of breath  no sun sensitivity  no Raynaud's  no digital ulcerations  + fatigue   6 pregnancies, 3 healthy children, 3 abortions.  no miscarriages.  3rd pregnancy was placenta previa.   no preeclampsia or eclampsia   Bone health -- stopped taking medications.  had a dexa by her endocrinologist recently and is working with Dr. Yates now as her endocrinologist.  (no longer seeing Dr. Barillas)  requests Lyme testing today because she found a tick on her chest a few weeks ago and didn't have a rash

## 2024-06-25 NOTE — DATA REVIEWED
[FreeTextEntry1] : Laboratory and radiology studies that were personally reviewed at today's visit are summarized in above and below: 4-:  gastric emptying sutdy - overall values WNL but there is evidence of impaired fundal accomodation.  8-2-2023:  + LAKESHA (no titer) on blood work from Dr. Herrera

## 2024-06-25 NOTE — ASSESSMENT
[FreeTextEntry1] : + LAKESHA  Constipation  Bipolar Depression   Chronic Nausea: (improving) Unclear etiology of nausea and likely is multifactorial but given + centromere can consider crest  Consider repeat EGD to look at fundus given the motility study with GI after SIBO therapy Advised her to proceed with SIBO therapy that has been prescribed by GI  Follow up with GI   + LAKESHA and + Centromere  Check Echo and CT chest to look for ILD/pHTN although low suspicion  semi-yearly follow up needed  defers testing ordered on 5-.    Osteoporosis  not treated for many years  follow up endocrinology (reminded her again and to make appointment) check vitamin d today  encouraged her to take calcium and to follow up endocrinology for treatment options.   OA mild and defers therapy at this time.   Today's medical care services serve as the continuing focal point for needed health care services that are part of ongoing care related to a patient's single, serious condition or a complex condition.   Time spent on the encounter included but is not limited to, preparing to see the patient, obtaining and/or reviewing separately obtained history, performing the evaluation, counseling and educating, independently interpreting results with communication to the patient, order placement, referring and/or communicating with other health professionals as described, and documenting clinical information in the electronic health record.   More than 50% of the encounter was spent counseling LETICIA ZAMORA on the differential, workup, disease course, and treatment/management.   Education was provided to LETICIA ZAMORA during this encounter. All questions and concerns were answered and addressed in detail.  LETICIA ZAMORA verbalized understanding and agreed to the plan.   LETICIA ZAMORA has been instructed to call for an earlier appointment if new symptoms develop in the interim. LETICIA ZAMORA has been instructed to make a follow-up appointment in 6 months

## 2024-06-26 LAB
ALBUMIN SERPL ELPH-MCNC: 4.2 G/DL
ALP BLD-CCNC: 98 U/L
ALT SERPL-CCNC: 23 U/L
ANION GAP SERPL CALC-SCNC: 11 MMOL/L
APPEARANCE: CLEAR
AST SERPL-CCNC: 35 U/L
BACTERIA: NEGATIVE /HPF
BASOPHILS # BLD AUTO: 0.04 K/UL
BASOPHILS NFR BLD AUTO: 0.6 %
BILIRUB SERPL-MCNC: 0.2 MG/DL
BILIRUBIN URINE: NEGATIVE
BLOOD URINE: NEGATIVE
BUN SERPL-MCNC: 27 MG/DL
CALCIUM SERPL-MCNC: 9.8 MG/DL
CAST: 0 /LPF
CHLORIDE SERPL-SCNC: 100 MMOL/L
CO2 SERPL-SCNC: 26 MMOL/L
COLOR: YELLOW
CREAT SERPL-MCNC: 0.94 MG/DL
CREAT SPEC-SCNC: 30 MG/DL
CREAT/PROT UR: 0.1 RATIO
EGFR: 66 ML/MIN/1.73M2
EOSINOPHIL # BLD AUTO: 0.37 K/UL
EOSINOPHIL NFR BLD AUTO: 6 %
EPITHELIAL CELLS: 1 /HPF
GLUCOSE QUALITATIVE U: NEGATIVE MG/DL
GLUCOSE SERPL-MCNC: 93 MG/DL
HCT VFR BLD CALC: 35.1 %
HGB BLD-MCNC: 11.3 G/DL
IMM GRANULOCYTES NFR BLD AUTO: 0.3 %
KETONES URINE: NEGATIVE MG/DL
LEUKOCYTE ESTERASE URINE: ABNORMAL
LYMPHOCYTES # BLD AUTO: 1.75 K/UL
LYMPHOCYTES NFR BLD AUTO: 28.3 %
MAN DIFF?: NORMAL
MCHC RBC-ENTMCNC: 31.6 PG
MCHC RBC-ENTMCNC: 32.2 GM/DL
MCV RBC AUTO: 98 FL
MICROSCOPIC-UA: NORMAL
MONOCYTES # BLD AUTO: 0.47 K/UL
MONOCYTES NFR BLD AUTO: 7.6 %
NEUTROPHILS # BLD AUTO: 3.54 K/UL
NEUTROPHILS NFR BLD AUTO: 57.2 %
NITRITE URINE: NEGATIVE
PH URINE: 7
PLATELET # BLD AUTO: 298 K/UL
POTASSIUM SERPL-SCNC: 4.5 MMOL/L
PROT SERPL-MCNC: 7 G/DL
PROT UR-MCNC: 4 MG/DL
PROTEIN URINE: NEGATIVE MG/DL
RBC # BLD: 3.58 M/UL
RBC # FLD: 13.3 %
RED BLOOD CELLS URINE: 1 /HPF
SODIUM SERPL-SCNC: 136 MMOL/L
SPECIFIC GRAVITY URINE: 1.01
UROBILINOGEN URINE: 0.2 MG/DL
VIT B12 SERPL-MCNC: 756 PG/ML
WBC # FLD AUTO: 6.19 K/UL
WHITE BLOOD CELLS URINE: 1 /HPF

## 2024-07-02 ENCOUNTER — APPOINTMENT (OUTPATIENT)
Dept: CT IMAGING | Facility: IMAGING CENTER | Age: 69
End: 2024-07-02

## 2024-07-05 ENCOUNTER — APPOINTMENT (OUTPATIENT)
Dept: CARDIOLOGY | Facility: CLINIC | Age: 69
End: 2024-07-05

## 2024-10-07 ENCOUNTER — APPOINTMENT (OUTPATIENT)
Dept: OTOLARYNGOLOGY | Facility: CLINIC | Age: 69
End: 2024-10-07
Payer: MEDICARE

## 2024-10-07 VITALS — WEIGHT: 114 LBS | HEIGHT: 62 IN | BODY MASS INDEX: 20.98 KG/M2

## 2024-10-07 DIAGNOSIS — D64.9 ANEMIA, UNSPECIFIED: ICD-10-CM

## 2024-10-07 DIAGNOSIS — H61.23 IMPACTED CERUMEN, BILATERAL: ICD-10-CM

## 2024-10-07 DIAGNOSIS — Z01.10 ENCOUNTER FOR EXAMINATION OF EARS AND HEARING W/OUT ABNORMAL FINDINGS: ICD-10-CM

## 2024-10-07 PROCEDURE — 69210 REMOVE IMPACTED EAR WAX UNI: CPT

## 2024-10-07 PROCEDURE — 99203 OFFICE O/P NEW LOW 30 MIN: CPT | Mod: 25

## 2024-10-07 RX ORDER — TRANYLCYPROMINE SULFATE 10 MG/1
10 TABLET, FILM COATED ORAL
Refills: 0 | Status: ACTIVE | COMMUNITY

## 2024-11-06 ENCOUNTER — APPOINTMENT (OUTPATIENT)
Dept: RHEUMATOLOGY | Facility: CLINIC | Age: 69
End: 2024-11-06
Payer: MEDICARE

## 2024-11-06 ENCOUNTER — LABORATORY RESULT (OUTPATIENT)
Age: 69
End: 2024-11-06

## 2024-11-06 VITALS
RESPIRATION RATE: 16 BRPM | DIASTOLIC BLOOD PRESSURE: 68 MMHG | BODY MASS INDEX: 20.98 KG/M2 | TEMPERATURE: 98.1 F | HEIGHT: 62 IN | SYSTOLIC BLOOD PRESSURE: 110 MMHG | WEIGHT: 114 LBS | HEART RATE: 90 BPM | OXYGEN SATURATION: 98 %

## 2024-11-06 DIAGNOSIS — R76.8 OTHER SPECIFIED ABNORMAL IMMUNOLOGICAL FINDINGS IN SERUM: ICD-10-CM

## 2024-11-06 DIAGNOSIS — M81.0 AGE-RELATED OSTEOPOROSIS W/OUT CURRENT PATHOLOGICAL FRACTURE: ICD-10-CM

## 2024-11-06 PROCEDURE — G2211 COMPLEX E/M VISIT ADD ON: CPT

## 2024-11-06 PROCEDURE — 99214 OFFICE O/P EST MOD 30 MIN: CPT

## 2024-11-07 LAB
25(OH)D3 SERPL-MCNC: 50.2 NG/ML
ALBUMIN SERPL ELPH-MCNC: 4.1 G/DL
ALP BLD-CCNC: 86 U/L
ALT SERPL-CCNC: 22 U/L
ANION GAP SERPL CALC-SCNC: 13 MMOL/L
AST SERPL-CCNC: 29 U/L
BASOPHILS # BLD AUTO: 0.05 K/UL
BASOPHILS NFR BLD AUTO: 0.7 %
BILIRUB SERPL-MCNC: 0.2 MG/DL
BUN SERPL-MCNC: 19 MG/DL
CALCIUM SERPL-MCNC: 9.3 MG/DL
CHLORIDE SERPL-SCNC: 104 MMOL/L
CO2 SERPL-SCNC: 22 MMOL/L
CREAT SERPL-MCNC: 1.07 MG/DL
CREAT SPEC-SCNC: 68 MG/DL
CREAT/PROT UR: 0.1 RATIO
CRP SERPL-MCNC: <3 MG/L
CYSTATIN C SERPL-MCNC: 0.99 MG/L
EGFR: 56 ML/MIN/1.73M2
EOSINOPHIL # BLD AUTO: 0.18 K/UL
EOSINOPHIL NFR BLD AUTO: 2.7 %
ERYTHROCYTE [SEDIMENTATION RATE] IN BLOOD BY WESTERGREN METHOD: 16 MM/HR
GFR/BSA.PRED SERPLBLD CYS-BASED-ARV: 71 ML/MIN/1.73M2
GLUCOSE SERPL-MCNC: 99 MG/DL
HCT VFR BLD CALC: 37.4 %
HGB BLD-MCNC: 11.9 G/DL
IMM GRANULOCYTES NFR BLD AUTO: 0.3 %
LYMPHOCYTES # BLD AUTO: 1.6 K/UL
LYMPHOCYTES NFR BLD AUTO: 23.7 %
MAN DIFF?: NORMAL
MCHC RBC-ENTMCNC: 31.8 G/DL
MCHC RBC-ENTMCNC: 32.1 PG
MCV RBC AUTO: 100.8 FL
MONOCYTES # BLD AUTO: 0.47 K/UL
MONOCYTES NFR BLD AUTO: 7 %
NEUTROPHILS # BLD AUTO: 4.42 K/UL
NEUTROPHILS NFR BLD AUTO: 65.6 %
PLATELET # BLD AUTO: 302 K/UL
POTASSIUM SERPL-SCNC: 4.6 MMOL/L
PROT SERPL-MCNC: 6.9 G/DL
PROT UR-MCNC: 7 MG/DL
RBC # BLD: 3.71 M/UL
RBC # FLD: 13 %
SODIUM SERPL-SCNC: 139 MMOL/L
WBC # FLD AUTO: 6.74 K/UL

## 2024-11-08 LAB
ENA SS-A AB SER IA-ACNC: <0.2 AL
ENA SS-B AB SER IA-ACNC: 0.2 AL

## 2024-11-12 DIAGNOSIS — R82.90 UNSPECIFIED ABNORMAL FINDINGS IN URINE: ICD-10-CM

## 2024-11-12 LAB
APPEARANCE: ABNORMAL
BACTERIA: ABNORMAL /HPF
BILIRUBIN URINE: NEGATIVE
BLOOD URINE: NEGATIVE
CAST: 8 /LPF
COLOR: YELLOW
EPITHELIAL CELLS: >36 /HPF
GLUCOSE QUALITATIVE U: NEGATIVE MG/DL
HYALINE CASTS: PRESENT
KETONES URINE: NEGATIVE MG/DL
LEUKOCYTE ESTERASE URINE: ABNORMAL
MICROSCOPIC-UA: NORMAL
NITRITE URINE: POSITIVE
PH URINE: 5.5
PROTEIN URINE: NEGATIVE MG/DL
RED BLOOD CELLS URINE: 0 /HPF
REVIEW: NORMAL
SPECIFIC GRAVITY URINE: 1.01
TRANSITIONAL EPITHELIAL CELLS: PRESENT
UROBILINOGEN URINE: 0.2 MG/DL
WHITE BLOOD CELLS URINE: 16 /HPF

## 2024-12-10 ENCOUNTER — APPOINTMENT (OUTPATIENT)
Dept: INTERNAL MEDICINE | Facility: CLINIC | Age: 69
End: 2024-12-10

## 2024-12-30 ENCOUNTER — APPOINTMENT (OUTPATIENT)
Dept: INTERNAL MEDICINE | Facility: CLINIC | Age: 69
End: 2024-12-30
Payer: MEDICARE

## 2024-12-30 ENCOUNTER — LABORATORY RESULT (OUTPATIENT)
Age: 69
End: 2024-12-30

## 2024-12-30 VITALS
BODY MASS INDEX: 20.8 KG/M2 | SYSTOLIC BLOOD PRESSURE: 110 MMHG | HEIGHT: 62 IN | WEIGHT: 113 LBS | DIASTOLIC BLOOD PRESSURE: 70 MMHG

## 2024-12-30 DIAGNOSIS — Z83.3 FAMILY HISTORY OF DIABETES MELLITUS: ICD-10-CM

## 2024-12-30 DIAGNOSIS — Z82.0 FAMILY HISTORY OF EPILEPSY AND OTHER DISEASES OF THE NERVOUS SYSTEM: ICD-10-CM

## 2024-12-30 DIAGNOSIS — R41.89 OTHER SYMPTOMS AND SIGNS INVOLVING COGNITIVE FUNCTIONS AND AWARENESS: ICD-10-CM

## 2024-12-30 DIAGNOSIS — H91.90 UNSPECIFIED HEARING LOSS, UNSPECIFIED EAR: ICD-10-CM

## 2024-12-30 DIAGNOSIS — U07.1 COVID-19: ICD-10-CM

## 2024-12-30 DIAGNOSIS — R11.0 NAUSEA: ICD-10-CM

## 2024-12-30 DIAGNOSIS — E06.3 AUTOIMMUNE THYROIDITIS: ICD-10-CM

## 2024-12-30 DIAGNOSIS — Z80.3 FAMILY HISTORY OF MALIGNANT NEOPLASM OF BREAST: ICD-10-CM

## 2024-12-30 DIAGNOSIS — G47.00 INSOMNIA, UNSPECIFIED: ICD-10-CM

## 2024-12-30 PROCEDURE — 99204 OFFICE O/P NEW MOD 45 MIN: CPT

## 2024-12-30 RX ORDER — SAW/PYGEUM/BETA/HERB/D3/B6/ZN 30 MG-25MG
100 CAPSULE ORAL
Refills: 0 | Status: ACTIVE | COMMUNITY
Start: 2024-12-30

## 2024-12-31 PROBLEM — U07.1 COVID-19: Status: RESOLVED | Noted: 2024-12-31 | Resolved: 2024-12-31

## 2025-01-02 ENCOUNTER — NON-APPOINTMENT (OUTPATIENT)
Age: 70
End: 2025-01-02

## 2025-01-02 LAB
B BURGDOR AB SER-IMP: NEGATIVE
B BURGDOR IGG+IGM SER QL: 0.06 INDEX

## 2025-01-07 ENCOUNTER — TRANSCRIPTION ENCOUNTER (OUTPATIENT)
Age: 70
End: 2025-01-07

## 2025-01-07 LAB
ARSENIC 24H UR-MRATE: 15 MCG/L
ARSENIC BLD-MCNC: 2 UG/L
ARSENIC/CREAT UR: 15 MCG/G CR
CADMIUM SERPL-MCNC: 1.1 UG/L
CADMIUM/CREAT UR: 0.8 MCG/G CR
CREATININE RANDOM URINE: 101 MG/DL
LEAD BLD-MCNC: 1.1 UG/DL
LEAD/CREAT UR: 1 MCG/G CR
MERCURY BLD-MCNC: 5 UG/L
MERCURY/CREAT UR: 2 MCG/G CR

## 2025-05-07 ENCOUNTER — APPOINTMENT (OUTPATIENT)
Dept: RHEUMATOLOGY | Facility: CLINIC | Age: 70
End: 2025-05-07
Payer: MEDICARE

## 2025-05-07 VITALS
HEART RATE: 88 BPM | HEIGHT: 62 IN | DIASTOLIC BLOOD PRESSURE: 68 MMHG | BODY MASS INDEX: 21.35 KG/M2 | RESPIRATION RATE: 16 BRPM | WEIGHT: 116 LBS | SYSTOLIC BLOOD PRESSURE: 100 MMHG | OXYGEN SATURATION: 98 %

## 2025-05-07 DIAGNOSIS — R76.8 OTHER SPECIFIED ABNORMAL IMMUNOLOGICAL FINDINGS IN SERUM: ICD-10-CM

## 2025-05-07 PROCEDURE — 99215 OFFICE O/P EST HI 40 MIN: CPT

## 2025-05-07 PROCEDURE — G2211 COMPLEX E/M VISIT ADD ON: CPT

## 2025-05-14 ENCOUNTER — RESULT REVIEW (OUTPATIENT)
Age: 70
End: 2025-05-14

## 2025-05-14 ENCOUNTER — APPOINTMENT (OUTPATIENT)
Dept: OBGYN | Facility: CLINIC | Age: 70
End: 2025-05-14
Payer: MEDICARE

## 2025-05-14 PROCEDURE — G0101: CPT | Mod: GA

## 2025-05-28 ENCOUNTER — APPOINTMENT (OUTPATIENT)
Dept: OBGYN | Facility: CLINIC | Age: 70
End: 2025-05-28
Payer: MEDICARE

## 2025-05-28 PROCEDURE — 76856 US EXAM PELVIC COMPLETE: CPT

## 2025-05-28 PROCEDURE — 76830 TRANSVAGINAL US NON-OB: CPT

## 2025-06-02 ENCOUNTER — LABORATORY RESULT (OUTPATIENT)
Age: 70
End: 2025-06-02

## 2025-08-13 ENCOUNTER — NON-APPOINTMENT (OUTPATIENT)
Age: 70
End: 2025-08-13

## 2025-08-14 DIAGNOSIS — E07.9 DISORDER OF THYROID, UNSPECIFIED: ICD-10-CM
